# Patient Record
Sex: MALE | Race: WHITE | Employment: STUDENT | URBAN - METROPOLITAN AREA
[De-identification: names, ages, dates, MRNs, and addresses within clinical notes are randomized per-mention and may not be internally consistent; named-entity substitution may affect disease eponyms.]

---

## 2020-06-29 PROBLEM — F90.0 ATTENTION DEFICIT HYPERACTIVITY DISORDER (ADHD), PREDOMINANTLY INATTENTIVE TYPE: Status: ACTIVE | Noted: 2020-06-29

## 2020-06-29 NOTE — PATIENT INSTRUCTIONS
Thank you for choosing Ivonne De Jesus MD at [place] [address]  To Do: Noel Torres  1. Please take meds as directed. [address] is located in [address] Monday, Tuesday & Friday – 8 a.m. to 4 p.m.   Wednesday, Thursday – 7 a.m. to 3 p.m those potential risks and we strive to make you healthier and to improve your quality of life.     Referrals, and Radiology Information:    If your insurance requires a referral to a specialist, please allow 5 business days to process your referral request. things  · Forgets    Hyperactive/impulsive  · Has trouble controlling impulses; might talk too much, interrupt, or have a hard time taking turns  · Is easy to upset or anger  · Is always moving (sometimes without purpose)  · Does not learn from mistakes anyone else. How should I use this medicine? Take this medicine by mouth with a glass of water. Follow the directions on the prescription label. This medicine is taken just one time per day, usually in the morning after waking up.  Take with or without fo arm, or leg movements  Side effects that usually do not require medical attention (report to your doctor or health care professional if they continue or are bothersome):  · dry mouth  · headache  · irritability  · loss of appetite  · nausea  · trouble slee medicine after the expiration date. What should I tell my health care provider before I take this medicine?   They need to know if you have any of these conditions:  · anxiety or panic attacks  · circulation problems in fingers and toes  · glaucoma  · hard this medicine will be monitored closely. Do not take this medicine close to bedtime. It may prevent you from sleeping. If you are going to need surgery, an MRI, a CT scan, or other procedure, tell your doctor that you are taking this medicine.  You may ne

## 2020-06-29 NOTE — H&P
[address]    History and Physical    Evalina Locker Patient Status:  No patient class for patient encounter    10/1/2000 MRN [id number]   Location [address] [facility] , [facility] Attending No att.  p Allergies: No Known Allergies  (Not in a hospital admission)      Review of Systems:     Constitutional: Negative for fatigue and fever. HENT: Negative for sore throat and trouble swallowing. Respiratory: Negative for cough and shortness of breath. him regarding possible side effects of these medications. Follow-up in 1 month before he leaves for [facility].    Kaylan Gibbons MD  6/29/2020

## 2020-07-28 ENCOUNTER — TELEPHONE (OUTPATIENT)
Dept: FAMILY MEDICINE CLINIC | Facility: CLINIC | Age: 20
End: 2020-07-28

## 2020-07-28 NOTE — TELEPHONE ENCOUNTER
Called patient, patient was no show to today's appointment. Unable to reach, no answer and voicemail not setup to leave message.  Possible no show fee will be applied to today's visit

## 2020-09-02 NOTE — PROGRESS NOTES
HPI:    Patient ID: David Hubbard is a 23year old male. HPI  Mr. Elder Gibson is a pleasant 22-year-old gentleman here today to follow-up for management of ADD.   I started him on Adderall 20 mg extended release daily and reports that this has been effecti Physical Exam  Constitutional: No distress. Neurological: He is alert. Psychiatric: He has a normal mood and affect.  His behavior is normal. Judgment and thought content normal.          ASSESSMENT/PLAN:   Attention deficit hyperactivity disorder (ad

## 2020-09-02 NOTE — PATIENT INSTRUCTIONS
Thank you for choosing Jan Aguirre MD at [facility]  To Do: Sean Hogan  1. Please take meds as directed. [facility] is located in [address]. Monday, Tuesday & Friday – 8 a.m. to 4 p.m.   Wednesday, Thursday – 7 a.m. to 3 p.m those potential risks and we strive to make you healthier and to improve your quality of life.     Referrals, and Radiology Information:    If your insurance requires a referral to a specialist, please allow 5 business days to process your referral request.

## 2020-12-03 NOTE — PATIENT INSTRUCTIONS
Thank you for choosing Felipe Bailey MD at [place] [address]  To Do: Seema Petty  1. Please take meds as directed. [address] is located in [address]. Monday, Tuesday & Friday – 8 a.m. to 4 p.m.   Wednesday, Thursday – 7 a.m. to 3 p.m those potential risks and we strive to make you healthier and to improve your quality of life.     Referrals, and Radiology Information:    If your insurance requires a referral to a specialist, please allow 5 business days to process your referral request.

## 2020-12-03 NOTE — PROGRESS NOTES
HPI:    Patient ID: Rohini Feliciano is a 21year old male. HPI  Mr. Ulysses Potters is a pleasant 45-year-old gentleman with history of ADD here for follow-up for his Adderall refills. He has been doing well with his current regimen.   He is able to focus and o General: No edema. Lymphadenopathy:     He has no cervical adenopathy. Neurological: He is alert. Psychiatric: He has a normal mood and affect. His behavior is normal.   Vitals reviewed.              ASSESSMENT/PLAN:   Need for vaccination  Atten

## 2021-03-11 NOTE — PATIENT INSTRUCTIONS
Thank you for choosing Felipe Bailey MD at [place] [address]  To Do: Seema Petty  1. Please take meds as directed. [address] is located in [address]. Monday, Tuesday & Friday – 8 a.m. to 4 p.m.   Wednesday, Thursday – 7 a.m. to 3 p.m those potential risks and we strive to make you healthier and to improve your quality of life.     Referrals, and Radiology Information:    If your insurance requires a referral to a specialist, please allow 5 business days to process your referral request. things  · Forgets    Hyperactive/impulsive  · Has trouble controlling impulses (might talk too much, interrupt, or have a hard time taking turns)  · Is easy to upset or anger  · Is always moving (sometimes without purpose)  · Does not learn from mistakes

## 2021-03-11 NOTE — PROGRESS NOTES
HPI/Subjective:   Patient ID: Patricia Diaz is a 21year old male. HPI  Mr. Darian Orellana is a pleasant 20 y/o M with h/o ADD here for his follow-up appointment. He takes Adderall 20 mg extended release daily and does not report side effects.   He feels matias flat. Bowel sounds are normal.      Palpations: Abdomen is soft. Musculoskeletal:      Cervical back: Neck supple. Right lower leg: No edema. Left lower leg: No edema. Lymphadenopathy:      Cervical: No cervical adenopathy.    Neurological:

## 2021-06-15 ENCOUNTER — TELEPHONE (OUTPATIENT)
Dept: FAMILY MEDICINE CLINIC | Facility: CLINIC | Age: 21
End: 2021-06-15

## 2021-06-15 RX ORDER — DEXTROAMPHETAMINE SACCHARATE, AMPHETAMINE ASPARTATE MONOHYDRATE, DEXTROAMPHETAMINE SULFATE AND AMPHETAMINE SULFATE 5; 5; 5; 5 MG/1; MG/1; MG/1; MG/1
20 CAPSULE, EXTENDED RELEASE ORAL DAILY
Qty: 30 CAPSULE | Refills: 0 | Status: SHIPPED | OUTPATIENT
Start: 2021-06-15 | End: 2021-07-15

## 2021-06-15 RX ORDER — DEXTROAMPHETAMINE SACCHARATE, AMPHETAMINE ASPARTATE MONOHYDRATE, DEXTROAMPHETAMINE SULFATE AND AMPHETAMINE SULFATE 5; 5; 5; 5 MG/1; MG/1; MG/1; MG/1
20 CAPSULE, EXTENDED RELEASE ORAL DAILY
Qty: 30 CAPSULE | Refills: 0 | Status: SHIPPED | OUTPATIENT
Start: 2021-08-16 | End: 2021-09-15

## 2021-06-15 RX ORDER — DEXTROAMPHETAMINE SACCHARATE, AMPHETAMINE ASPARTATE MONOHYDRATE, DEXTROAMPHETAMINE SULFATE AND AMPHETAMINE SULFATE 5; 5; 5; 5 MG/1; MG/1; MG/1; MG/1
20 CAPSULE, EXTENDED RELEASE ORAL DAILY
Qty: 30 CAPSULE | Refills: 0 | Status: SHIPPED | OUTPATIENT
Start: 2021-07-16 | End: 2021-08-15

## 2021-06-15 NOTE — TELEPHONE ENCOUNTER
Patient states his next appt is supposed to be in September but he only gave 3 mo supply of medication in March for Adderall. Does he need to be seen now or will he refill for 3 more months? Please advise.

## 2021-06-15 NOTE — TELEPHONE ENCOUNTER
AVS from 3/11/21 states return in 6 months around 21  Medication Quantity Refills Start End   Amphetamine-Dextroamphet ER (ADDERALL XR) 20 MG Oral Capsule SR 24 Hr () 30 capsule 0 2021   Sig:   Take 1 capsule (20 mg total) by semaj

## 2023-12-12 ENCOUNTER — OFFICE VISIT (OUTPATIENT)
Dept: FAMILY MEDICINE CLINIC | Facility: CLINIC | Age: 23
End: 2023-12-12
Payer: COMMERCIAL

## 2023-12-12 ENCOUNTER — LAB ENCOUNTER (OUTPATIENT)
Dept: LAB | Age: 23
End: 2023-12-12
Attending: FAMILY MEDICINE
Payer: COMMERCIAL

## 2023-12-12 VITALS
SYSTOLIC BLOOD PRESSURE: 112 MMHG | HEIGHT: 69 IN | DIASTOLIC BLOOD PRESSURE: 70 MMHG | OXYGEN SATURATION: 98 % | WEIGHT: 177 LBS | TEMPERATURE: 98 F | HEART RATE: 74 BPM | BODY MASS INDEX: 26.22 KG/M2 | RESPIRATION RATE: 16 BRPM

## 2023-12-12 DIAGNOSIS — Z00.00 WELLNESS EXAMINATION: Primary | ICD-10-CM

## 2023-12-12 DIAGNOSIS — J32.1 CHRONIC FRONTAL SINUSITIS: ICD-10-CM

## 2023-12-12 DIAGNOSIS — Z00.00 WELLNESS EXAMINATION: ICD-10-CM

## 2023-12-12 DIAGNOSIS — B35.6 TINEA CRURIS: ICD-10-CM

## 2023-12-12 LAB
ALBUMIN SERPL-MCNC: 4.2 G/DL (ref 3.4–5)
ALBUMIN/GLOB SERPL: 1.4 {RATIO} (ref 1–2)
ALP LIVER SERPL-CCNC: 60 U/L
ALT SERPL-CCNC: 33 U/L
ANION GAP SERPL CALC-SCNC: 6 MMOL/L (ref 0–18)
AST SERPL-CCNC: 22 U/L (ref 15–37)
BASOPHILS # BLD AUTO: 0.02 X10(3) UL (ref 0–0.2)
BASOPHILS NFR BLD AUTO: 0.4 %
BILIRUB SERPL-MCNC: 1 MG/DL (ref 0.1–2)
BUN BLD-MCNC: 16 MG/DL (ref 9–23)
CALCIUM BLD-MCNC: 9.1 MG/DL (ref 8.5–10.1)
CHLORIDE SERPL-SCNC: 104 MMOL/L (ref 98–112)
CHOLEST SERPL-MCNC: 161 MG/DL (ref ?–200)
CO2 SERPL-SCNC: 27 MMOL/L (ref 21–32)
CREAT BLD-MCNC: 1.24 MG/DL
EGFRCR SERPLBLD CKD-EPI 2021: 84 ML/MIN/1.73M2 (ref 60–?)
EOSINOPHIL # BLD AUTO: 0.09 X10(3) UL (ref 0–0.7)
EOSINOPHIL NFR BLD AUTO: 2 %
ERYTHROCYTE [DISTWIDTH] IN BLOOD BY AUTOMATED COUNT: 11.8 %
FASTING PATIENT LIPID ANSWER: YES
FASTING STATUS PATIENT QL REPORTED: YES
GLOBULIN PLAS-MCNC: 3 G/DL (ref 2.8–4.4)
GLUCOSE BLD-MCNC: 92 MG/DL (ref 70–99)
HCT VFR BLD AUTO: 42.8 %
HDLC SERPL-MCNC: 40 MG/DL (ref 40–59)
HGB BLD-MCNC: 15.1 G/DL
IMM GRANULOCYTES # BLD AUTO: 0.01 X10(3) UL (ref 0–1)
IMM GRANULOCYTES NFR BLD: 0.2 %
LDLC SERPL CALC-MCNC: 94 MG/DL (ref ?–100)
LYMPHOCYTES # BLD AUTO: 1.23 X10(3) UL (ref 1–4)
LYMPHOCYTES NFR BLD AUTO: 27 %
MCH RBC QN AUTO: 32.3 PG (ref 26–34)
MCHC RBC AUTO-ENTMCNC: 35.3 G/DL (ref 31–37)
MCV RBC AUTO: 91.6 FL
MONOCYTES # BLD AUTO: 0.36 X10(3) UL (ref 0.1–1)
MONOCYTES NFR BLD AUTO: 7.9 %
NEUTROPHILS # BLD AUTO: 2.84 X10 (3) UL (ref 1.5–7.7)
NEUTROPHILS # BLD AUTO: 2.84 X10(3) UL (ref 1.5–7.7)
NEUTROPHILS NFR BLD AUTO: 62.5 %
NONHDLC SERPL-MCNC: 121 MG/DL (ref ?–130)
OSMOLALITY SERPL CALC.SUM OF ELEC: 285 MOSM/KG (ref 275–295)
PLATELET # BLD AUTO: 197 10(3)UL (ref 150–450)
POTASSIUM SERPL-SCNC: 3.7 MMOL/L (ref 3.5–5.1)
PROT SERPL-MCNC: 7.2 G/DL (ref 6.4–8.2)
RBC # BLD AUTO: 4.67 X10(6)UL
SODIUM SERPL-SCNC: 137 MMOL/L (ref 136–145)
T4 FREE SERPL-MCNC: 1 NG/DL (ref 0.8–1.7)
TRIGL SERPL-MCNC: 157 MG/DL (ref 30–149)
TSI SER-ACNC: 1.94 MIU/ML (ref 0.36–3.74)
VLDLC SERPL CALC-MCNC: 26 MG/DL (ref 0–30)
WBC # BLD AUTO: 4.6 X10(3) UL (ref 4–11)

## 2023-12-12 PROCEDURE — 85025 COMPLETE CBC W/AUTO DIFF WBC: CPT

## 2023-12-12 PROCEDURE — 3008F BODY MASS INDEX DOCD: CPT | Performed by: FAMILY MEDICINE

## 2023-12-12 PROCEDURE — 80053 COMPREHEN METABOLIC PANEL: CPT

## 2023-12-12 PROCEDURE — 36415 COLL VENOUS BLD VENIPUNCTURE: CPT

## 2023-12-12 PROCEDURE — 84443 ASSAY THYROID STIM HORMONE: CPT

## 2023-12-12 PROCEDURE — 3078F DIAST BP <80 MM HG: CPT | Performed by: FAMILY MEDICINE

## 2023-12-12 PROCEDURE — 3074F SYST BP LT 130 MM HG: CPT | Performed by: FAMILY MEDICINE

## 2023-12-12 PROCEDURE — 80061 LIPID PANEL: CPT

## 2023-12-12 PROCEDURE — 84439 ASSAY OF FREE THYROXINE: CPT

## 2023-12-12 PROCEDURE — 99385 PREV VISIT NEW AGE 18-39: CPT | Performed by: FAMILY MEDICINE

## 2023-12-12 RX ORDER — KETOCONAZOLE 20 MG/G
1 CREAM TOPICAL DAILY
Qty: 30 G | Refills: 0 | Status: SHIPPED | OUTPATIENT
Start: 2023-12-12 | End: 2023-12-26